# Patient Record
Sex: FEMALE | Race: WHITE | NOT HISPANIC OR LATINO | URBAN - METROPOLITAN AREA
[De-identification: names, ages, dates, MRNs, and addresses within clinical notes are randomized per-mention and may not be internally consistent; named-entity substitution may affect disease eponyms.]

---

## 2019-02-19 NOTE — PATIENT DISCUSSION
The types of intraocular lenses were reviewed with the patient along with a discussion of their various strengths and weaknesses.  The patient elects Basic IOL OS, goal emmetropia.  The patient understands and accepts need for prescription spectacles at all focal points for best vision.

## 2019-03-11 NOTE — PATIENT DISCUSSION
Patient advised of the right to post-operative care by the surgeon. Patient is fully informed of, and agreed to, co-management with their primary optometric physician. Post-operative care by the surgeon is not medically necessary and co-management is clinically appropriate. Patient has received itemization of fees related to cataract surgery. Transfer of care letter completed for the patient. Transfer care of left eye to Dr. Sherly Huitron on 3/11/19. Patient instructed to call immediately if any new distortion, blurring, decreased vision or eye pain.

## 2019-03-11 NOTE — PROCEDURE NOTE: SURGICAL
"<span style=""font-weight:bold;"">MR #:</span> [id number]<br /><br /><span style=""font-weight:bold;"">PREOPERATIVE DIAGNOSIS:</span> Cataract

## 2019-03-18 NOTE — PATIENT DISCUSSION
Cataract surgery has been performed in the first eye and activities of daily living are still impaired. The patient would like to proceed with cataract surgery in the second eye as scheduled. The patient elects Basic IOL OD, goal of emmetropia.

## 2019-03-18 NOTE — PROCEDURE NOTE: SURGICAL
"<span style=""font-weight:bold;"">MR #:</span> [id number]<br /><br /><span style=""font-weight:bold;"">PREOPERATIVE DIAGNOSIS:</span> Cataract

## 2019-05-09 NOTE — PATIENT DISCUSSION
Topical anti-inflammatory therapy was recommended.  Start PA QID and Bromsite BID.  No active SRNVM or blood, this is CME.  Possible role for ANTI-VEGF or intravitreal steroid in future if does not respons to drops.  RTC in 4-5 weeks sooner prn.

## 2019-05-09 NOTE — PATIENT DISCUSSION
Discussed presence of mild ERM. Recommended observation for now. Can consider surgical intervention in the future if the ERM progresses and the patient becomes more symptomatic or if #1 worsens.

## 2019-06-13 NOTE — PATIENT DISCUSSION
Pt edu improving. Discussed drops vs injection. Since improving with drops, recommend continuing with least invasive treatment. Continue with BromSite 1 gtt BID (sample given) and Prednisolone 1 gtt QID. Will follow up in 1 months. Call with any vision changes.

## 2019-07-11 NOTE — PATIENT DISCUSSION
Based on today’s exam, diagnostic studies, and/or review of records, the determination was made for treatment.

## 2019-07-11 NOTE — PATIENT DISCUSSION
"Worse today, at this point would rec trying an injection of Avastin.  Risks/Goals/Benifits and ""Off-Label"" use of Avastin discussed.  RTC x N/A for tx.  Pt to cont gtts as he is taking for now. "

## 2019-07-24 NOTE — PROCEDURE NOTE: CLINICAL
PROCEDURE NOTE: Avastin () #1 OS. Diagnosis: CME. Prep: Betadine Drops and Betadine Scrub. Prior to the original injection, risks/benefits/alternatives discussed including infection, loss of vision, hemorrhage, cataract, glaucoma, retinal tears or detachment. A written consent is on file, and the need for today’s injection was discussed and the patient is understanding and wishes to proceed. The off-label status of Intravitreal Avastin also was reviewed. The patient wished to proceed with treatment. The patient wished to proceed with treatment. Topical anesthetic drops were applied to the eye. Betadine prep was performed. Surgical mask worn. Sterile drape and lid speculum were applied. Using the syringe provided, Avastin 1.25 mg in 0.05 cc was injected into the vitreous cavity. Injection site: 3-4 mm from the limbus. Patient tolerated procedure well. Following the intravitreal injection, the sterile lid speculum was removed. CRA perfusion confirmed. CF vision checked. Patient given office phone number/answering service number and advised to call immediately should there be an increase in floaters or redness, loss of vision or pain, or should they have any other questions or concerns. OSORIO#[id number] exp 09/12/2019.

## 2019-08-22 NOTE — PATIENT DISCUSSION
Pt edu improved but still some trace edema. Would recommend continuing with treatments, recommend Avastin #2 in 2 weeks. Pt elects to proceed. Also recommend continuing with Pred QID and BromSite BID. If no visual improvement following next treatment may want to discussed PPv.

## 2019-09-04 NOTE — PROCEDURE NOTE: CLINICAL
PROCEDURE NOTE: Avastin () #2 OS. Diagnosis: CME. Prep: Betadine Drops and Betadine Scrub. Prior to the original injection, risks/benefits/alternatives discussed including infection, loss of vision, hemorrhage, cataract, glaucoma, retinal tears or detachment. A written consent is on file, and the need for today’s injection was discussed and the patient is understanding and wishes to proceed. The off-label status of Intravitreal Avastin also was reviewed. The patient wished to proceed with treatment. The patient wished to proceed with treatment. Topical anesthetic drops were applied to the eye. Betadine prep was performed. Surgical mask worn. Sterile drape and lid speculum were applied. Using the syringe provided, Avastin 1.25 mg in 0.05 cc was injected into the vitreous cavity. Injection site: 3-4 mm from the limbus. Patient tolerated procedure well. Following the intravitreal injection, the sterile lid speculum was removed. CRA perfusion confirmed. CF vision checked. Patient given office phone number/answering service number and advised to call immediately should there be an increase in floaters or redness, loss of vision or pain, or should they have any other questions or concerns. Keara Vargas

## 2020-01-08 ENCOUNTER — IMPORTED ENCOUNTER (OUTPATIENT)
Dept: URBAN - METROPOLITAN AREA CLINIC 9 | Facility: CLINIC | Age: 80
End: 2020-01-08

## 2020-01-15 ENCOUNTER — IMPORTED ENCOUNTER (OUTPATIENT)
Dept: URBAN - METROPOLITAN AREA CLINIC 9 | Facility: CLINIC | Age: 80
End: 2020-01-15

## 2020-07-15 ENCOUNTER — IMPORTED ENCOUNTER (OUTPATIENT)
Dept: URBAN - METROPOLITAN AREA CLINIC 9 | Facility: CLINIC | Age: 80
End: 2020-07-15

## 2021-07-07 ENCOUNTER — IMPORTED ENCOUNTER (OUTPATIENT)
Dept: URBAN - METROPOLITAN AREA CLINIC 9 | Facility: CLINIC | Age: 81
End: 2021-07-07

## 2021-07-14 ENCOUNTER — IMPORTED ENCOUNTER (OUTPATIENT)
Dept: URBAN - METROPOLITAN AREA CLINIC 9 | Facility: CLINIC | Age: 81
End: 2021-07-14

## 2021-09-13 ENCOUNTER — IMPORTED ENCOUNTER (OUTPATIENT)
Dept: URBAN - METROPOLITAN AREA CLINIC 9 | Facility: CLINIC | Age: 81
End: 2021-09-13

## 2021-09-13 PROBLEM — Z98.890: Noted: 2022-02-03

## 2021-09-13 PROBLEM — H04.121: Noted: 2021-09-13

## 2021-09-16 NOTE — PATIENT DISCUSSION
Pt did not follow up after last inj so  can not tell if was improved after last inj, pt unsure if gtts or inj help.  Rec start w/ topical therapy first-PA QID, Bromsite BID.

## 2021-10-14 NOTE — PATIENT DISCUSSION
Resolved edema today, good response for topical therapy.  Taper gtts-PA 3/2/1 stop and Bromsite BID stop when runs out.

## 2021-10-16 ASSESSMENT — VISUAL ACUITY
OD_SC: 20/50 - SN
OD_CC: 20/50 - SN
OD_CC: 20/40 + SN
OS_CC: 20/50 + SN
OD_CC: 20/50 + SN
OD_CC: 20/70 -2 SN
OS_CC: 20/40 -2 SN
OS_SC: 20/60 - SN
OS_CC: 20/70 + SN
OD_CC: 20/40 - SN
OS_CC: 20/40 -2 SN
OD_CC: 20/70 + SN
OS_CC: 20/70 - SN
OS_CC: 20/40 -2 SN

## 2021-10-16 ASSESSMENT — KERATOMETRY
OS_AXISANGLE_DEGREES: 119
OS_K1POWER_DIOPTERS: 43.25
OD_AXISANGLE_DEGREES: 73
OD_K1POWER_DIOPTERS: 43
OD_K2POWER_DIOPTERS: 44
OS_K2POWER_DIOPTERS: 44.25
OS_AXISANGLE2_DEGREES: 29
OD_AXISANGLE2_DEGREES: 163

## 2021-10-16 ASSESSMENT — TONOMETRY
OD_IOP_MMHG: 19
OS_IOP_MMHG: 20
OD_IOP_MMHG: 22
OS_IOP_MMHG: 20
OS_IOP_MMHG: 18
OS_IOP_MMHG: 21
OD_IOP_MMHG: 18
OD_IOP_MMHG: 15
OD_IOP_MMHG: 21
OS_IOP_MMHG: 14

## 2021-11-08 ENCOUNTER — ESTABLISHED PATIENT (OUTPATIENT)
Dept: URBAN - METROPOLITAN AREA CLINIC 9 | Facility: CLINIC | Age: 81
End: 2021-11-08

## 2021-11-08 DIAGNOSIS — H04.121: ICD-10-CM

## 2021-11-08 DIAGNOSIS — H02.834: ICD-10-CM

## 2021-11-08 DIAGNOSIS — H02.831: ICD-10-CM

## 2021-11-08 DIAGNOSIS — H04.122: ICD-10-CM

## 2021-11-08 PROCEDURE — 92012 INTRM OPH EXAM EST PATIENT: CPT

## 2021-11-08 ASSESSMENT — VISUAL ACUITY
OD_SC: 20/80-1
OS_SC: 20/60-1

## 2021-11-16 ENCOUNTER — ESTABLISHED PATIENT (OUTPATIENT)
Dept: URBAN - METROPOLITAN AREA CLINIC 4 | Facility: CLINIC | Age: 81
End: 2021-11-16

## 2021-11-16 DIAGNOSIS — H02.834: ICD-10-CM

## 2021-11-16 DIAGNOSIS — H02.831: ICD-10-CM

## 2021-11-16 PROCEDURE — 92012 INTRM OPH EXAM EST PATIENT: CPT

## 2021-11-16 PROCEDURE — 92082 INTERMEDIATE VISUAL FIELD XM: CPT

## 2021-11-16 PROCEDURE — 92285 EXTERNAL OCULAR PHOTOGRAPHY: CPT

## 2021-12-09 NOTE — PATIENT DISCUSSION
No progression seen on todays exam or OCT. Remains stable. Previously discussed with patient presence of mild ERM. Recommended observation for now. Can consider surgical intervention in the future if the ERM progresses and the patient becomes more symptomatic or if #1 worsens.

## 2021-12-09 NOTE — PATIENT DISCUSSION
Todays Exam and OCT show patient is stable. Cystoid Macular Edema remains resolved, and is unlikely to come back.  Patient had good response to previous topical therapy and Avastin Injection. Recommend patient return in 1 year for Comp/R.

## 2022-02-03 ENCOUNTER — POST-OP (OUTPATIENT)
Dept: URBAN - METROPOLITAN AREA CLINIC 17 | Facility: CLINIC | Age: 82
End: 2022-02-03

## 2022-02-03 DIAGNOSIS — Z98.890: ICD-10-CM

## 2022-02-03 PROCEDURE — 99024 POSTOP FOLLOW-UP VISIT: CPT

## 2022-02-08 ENCOUNTER — POST-OP (OUTPATIENT)
Dept: URBAN - METROPOLITAN AREA CLINIC 4 | Facility: CLINIC | Age: 82
End: 2022-02-08

## 2022-02-08 DIAGNOSIS — Z98.890: ICD-10-CM

## 2022-02-08 PROCEDURE — 99024 POSTOP FOLLOW-UP VISIT: CPT

## 2022-03-08 ENCOUNTER — POST-OP (OUTPATIENT)
Dept: URBAN - METROPOLITAN AREA CLINIC 4 | Facility: CLINIC | Age: 82
End: 2022-03-08

## 2022-03-08 DIAGNOSIS — Z98.890: ICD-10-CM

## 2022-03-08 PROCEDURE — 99024 POSTOP FOLLOW-UP VISIT: CPT

## 2022-06-25 RX ORDER — BUPROPION HYDROCHLORIDE 150 MG/1
TABLET ORAL
COMMUNITY
Start: 2022-04-20

## 2022-06-25 RX ORDER — BUSPIRONE HYDROCHLORIDE 10 MG/1
TABLET ORAL
COMMUNITY
End: 2022-10-06

## 2022-06-25 RX ORDER — CITALOPRAM 40 MG/1
TABLET ORAL
COMMUNITY
End: 2022-10-25 | Stop reason: SDUPTHER

## 2022-06-25 RX ORDER — ATORVASTATIN CALCIUM 40 MG/1
TABLET, FILM COATED ORAL
COMMUNITY
End: 2022-10-06 | Stop reason: SDUPTHER

## 2022-06-25 RX ORDER — MEMANTINE HYDROCHLORIDE 5 MG/1
TABLET ORAL
COMMUNITY
Start: 2022-05-17

## 2022-06-25 RX ORDER — AMLODIPINE BESYLATE 10 MG/1
TABLET ORAL
COMMUNITY
End: 2022-08-09

## 2022-06-25 RX ORDER — DONEPEZIL HYDROCHLORIDE 5 MG/1
TABLET, FILM COATED ORAL
COMMUNITY
Start: 2022-04-20

## 2022-06-25 RX ORDER — LOSARTAN POTASSIUM 100 MG/1
TABLET ORAL
COMMUNITY
End: 2022-08-09

## 2022-09-12 ENCOUNTER — FOLLOW UP (OUTPATIENT)
Dept: URBAN - METROPOLITAN AREA CLINIC 9 | Facility: CLINIC | Age: 82
End: 2022-09-12

## 2022-09-12 DIAGNOSIS — H04.121: ICD-10-CM

## 2022-09-12 PROCEDURE — 99213 OFFICE O/P EST LOW 20 MIN: CPT

## 2022-09-12 ASSESSMENT — VISUAL ACUITY
OU_SC: 20/100
OS_CC: 20/400
OD_CC: 20/100
OS_SC: 20/100
OU_CC: 20/100
OD_SC: 20/100